# Patient Record
Sex: FEMALE | Race: OTHER | NOT HISPANIC OR LATINO
[De-identification: names, ages, dates, MRNs, and addresses within clinical notes are randomized per-mention and may not be internally consistent; named-entity substitution may affect disease eponyms.]

---

## 2019-07-15 ENCOUNTER — APPOINTMENT (OUTPATIENT)
Dept: OBGYN | Facility: CLINIC | Age: 39
End: 2019-07-15

## 2019-09-19 ENCOUNTER — EMERGENCY (EMERGENCY)
Facility: HOSPITAL | Age: 39
LOS: 0 days | Discharge: HOME | End: 2019-09-19
Attending: EMERGENCY MEDICINE | Admitting: EMERGENCY MEDICINE
Payer: COMMERCIAL

## 2019-09-19 VITALS
SYSTOLIC BLOOD PRESSURE: 119 MMHG | OXYGEN SATURATION: 100 % | HEIGHT: 65 IN | HEART RATE: 70 BPM | WEIGHT: 141.98 LBS | RESPIRATION RATE: 19 BRPM | DIASTOLIC BLOOD PRESSURE: 79 MMHG | TEMPERATURE: 98 F
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
## 2019-09-19 DIAGNOSIS — S09.90XA UNSPECIFIED INJURY OF HEAD, INITIAL ENCOUNTER: ICD-10-CM

## 2019-09-19 DIAGNOSIS — S60.811A ABRASION OF RIGHT WRIST, INITIAL ENCOUNTER: ICD-10-CM
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
## 2019-09-19 DIAGNOSIS — T74.11XA ADULT PHYSICAL ABUSE, CONFIRMED, INITIAL ENCOUNTER: ICD-10-CM
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
## 2019-09-19 DIAGNOSIS — S20.222A CONTUSION OF LEFT BACK WALL OF THORAX, INITIAL ENCOUNTER: ICD-10-CM

## 2019-09-19 PROCEDURE — 99283 EMERGENCY DEPT VISIT LOW MDM: CPT

## 2019-09-19 RX ORDER — IBUPROFEN 200 MG
600 TABLET ORAL ONCE
Refills: 0 | Status: COMPLETED | OUTPATIENT
Start: 2019-09-19 | End: 2019-09-19
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
## 2019-09-19 NOTE — ED PROVIDER NOTE - NSFOLLOWUPINSTRUCTIONS_ED_ALL_ED_FT
Abrasion    WHAT YOU NEED TO KNOW:  An abrasion is a scrape on your skin. It happens when your skin rubs against a rough surface. Some examples of an abrasion include rug burn, a skinned elbow, or road rash. Abrasions can be many shapes and sizes. The wound may hurt, bleed, bruise, or swell.     DISCHARGE INSTRUCTIONS:    Return to the emergency department if:   - The bleeding does not stop after 10 minutes of firm pressure.  - You cannot rinse one or more foreign objects out of your wound.  - You have red streaks on your skin coming from your wound.    Contact your healthcare provider if:     - You have a fever or chills.   - Your abrasion is red, warm, swollen, or draining pus.    You have questions or concerns about your condition or care.    Care for your abrasion:   - Wash your hands and dry them with a clean towel.  - Press a clean cloth against your wound to stop any bleeding.  - Rinse your wound with a lot of clean water. Do not use harsh soap, alcohol, or iodine solutions.  - Use a clean, wet cloth to remove any objects, such as small pieces of rocks or dirt.  - Rub antibiotic ointment on your wound. This may help prevent infection and help your wound heal.  - Cover the wound with a non-stick bandage. Change the bandage daily, and if gets wet or dirty.     Follow up with your healthcare provider as directed: Write down your questions so you remember to ask them during your visits.     Contusion    A contusion is a deep bruise. Contusions are the result of a blunt injury to tissues and muscle fibers under the skin. The skin overlying the contusion may turn blue, purple, or yellow. Symptoms also include pain and swelling in the injured area.    SEEK IMMEDIATE MEDICAL CARE IF YOU HAVE THE FOLLOWING SYMPTOMS: severe pain, numbness, tingling, pain, weakness, or skin color/temperature change in any part of your body distal to the fracture.     Intimate Partner Violence: [place]    Intimate partner violence, also called domestic violence, domestic abuse, or relationship abuse, is a pattern of behaviors used by one partner to gain or maintain power and control over the other partner. Intimate partner violence can happen to women and men and can happen between people who are or were:  .  Dating.  Living together.  [place] law    [place] law defines domestic violence as:    "A pattern of coercive tactics which can include physical, psychological, sexual, economic, and emotional abuse, perpetrated by one person against an adult intimate partner, with the goal of establishing and maintaining power and control over the victim."    What are the types of intimate partner violence?  Different types of abuse can occur at the same time within the same relationship.  Physical abuse. This includes rough handling, threats with a weapon, throwing objects, pushing, or hitting.  Emotional and psychological abuse. This includes verbal attacks, rejection, humiliation, intimidation, social isolation, or threats. Abuse may also include limiting contact with family and friends.  Sexual assault. Sexual assault is any unwanted sexual activity that occurs without clear permission (consent) from both people. This includes unwanted touching and sexual harassment.  Economic abuse. This includes controlling money, food, transportation, or other belongings.  Stalking. This involves such things as repeated, unwanted phone calls, e-mails, or text messages, or watching the victim from a distance.    What are some warning signs of intimate partner violence?  Physical signs     Bruises.  Broken bones.  Burns or cuts.  Physical pain.  Head injury.  Emotional and psychological signs     Crying.  Depression.  Hopelessness.  Desperation.  Trouble sleeping.  Fear of the partner.  Anxiety.  Suicidal thoughts or behavior.  Antisocial behavior.  Low self-esteem.  Fear of intimacy.  Flashbacks.  Sexual signs     Bruising, swelling, or bleeding of the genital or rectal area.  Signs of an STI, such as genital sores, warts, or discharge coming from the genital area.  Pain in the genital area.  Unintended pregnancy.  Problems with pregnancy.  What are common behaviors of those affected by intimate partner violence?  Those affected by intimate partner violence may:  Be late to work or other events.  Not show up to places as promised.  Have to let their partner know where they are and who they are with.  Be isolated or kept from seeing friends or family.  Make comments about their partner's temper or behavior.  Make excuses for their partner.  Engage in high-risk sexual behaviors.  Use drugs or alcohol.  Have unhealthy eating behaviors.  What are common feelings of those affected by intimate partner violence?  Victims of intimate partner violence may feel that they:  Must be careful not to say or do things that trigger their partner's anger.  Cannot do anything right.  Deserve to be treated badly.  Overreact to their partner's behavior or temper.  Cannot trust their own feelings.  Cannot trust other people.  Are trapped.  May have their children taken away by their partner.  Are emotionally drained or numb.  Are in danger.  Might have to kill their partner to survive.  Where can you get help?  If you do not feel safe searching for help online at home, use a computer at a [employer] to access the Internet. Call 911 if you are in immediate danger or need medical help.    Intimate partner violence hotlines and websites     The National Domestic Violence Hotline.  24-hour hotline: 1-911.998.6907 (SAFE) or 1-580.173.7280 (TTY).  Videophone: available Monday through Friday, 9 a.m. to 5 p.m. Call 1-940.467.2972.  [employer]  The [facility] Domestic Violence Hotline. This service is available in multiple languages.  Call: 1-913.691.8828. If you are deaf or hard of hearing, dial 711.  The [facility] Domestic Violence Hotline.  Call: 1-944.592.1584 (HOPE). If you are deaf or hard of hearing, dial 311.  The [place] Sexual Assault Hotline.  24-hour phone hotline: 1-355.260.8592.  [employer]  Shelters for victims of intimate partner violence     If you are a victim of intimate partner violence, there are resources to help you find a temporary place for you and your children to live (shelter). The specific address of these shelters is often not known to the public. A complete list, by [facility], of domestic counseling centers and shelters in [facility] is online: www.[facility].org/find-help/program-directory.html    Police     Report assaults, threats, and stalking to the police.    Counselors and counseling centers      Counseling can help you cope with difficult emotions and empower you to plan for your future safety. The topics you discuss with a counselor are private and confidential. Children of intimate partner violence victims also might need counseling to manage stress and anxiety.    The court system     You can work with a  or an advocate to get legal protection against an abuser. Protection includes restraining orders and private addresses. Crimes against you, such as assault, can also be prosecuted through the courts.    For legal information, contact [employer]:  1-860.758.3218 (HOPE)  [employer].org    Follow these instructions at home:  Create a safety plan that includes ways to remain safe while in an abusive relationship, while you're planning to leave, or after you leave. This plan may be created by the victim alone or with assistance from the domestic violence hotline staff or local shelter staff. Your safety plan may include:  How to cope with emotions.  How to tell friends and family about the abuse.  How to take legal action.  How to create a safe home environment.  How to keep your children safe.  Emergency plans for life-threatening situations.  Get help right away if you:  Feel like you are in immediate danger.  Feel like you may hurt yourself or others.    If you ever feel like you may hurt yourself or others, or may have thoughts about taking your own life, get help right away. You can go to your nearest emergency department or call:     Your local emergency services (911 in the [place]   A suicide crisis helpline, such as the National Suicide Prevention Lifeline at 1-563.843.1453. This is open 24 hours a day.     Summary  If you are in an abusive relationship, there are resources available to you to find ways to leave the relationship.  Create a safety plan to find ways to remain safe while in an abusive relationship, while you're planning to leave, or after you leave.  This information is not intended to replace advice given to you by your health care provider. Make sure you discuss any questions you have with your health care provider.
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
## 2019-09-19 NOTE — ED PROVIDER NOTE - PATIENT PORTAL LINK FT
You can access the FollowMyHealth Patient Portal offered by [facility] by registering at the following website: [email or web address]. By joining [employer]’s FollowMyHealth portal, you will also be able to view your health information using other applications (apps) compatible with our system.

## 2019-09-19 NOTE — ED ADULT NURSE NOTE - NSIMPLEMENTINTERV_GEN_ALL_ED
Implemented All Universal Safety Interventions:  [place] to call system. Call bell, personal items and telephone within reach. Instruct patient to call for assistance. Room bathroom lighting operational. Non-slip footwear when patient is off stretcher. Physically safe environment: no spills, clutter or unnecessary equipment. Stretcher in lowest position, wheels locked, appropriate side rails in place.
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
## 2019-09-19 NOTE — ED PROVIDER NOTE - NS ED ROS FT
Constitutional: no fever, chills, no recent weight loss, change in appetite or malaise  Eyes: no redness/discharge/pain/vision changes  ENT: no rhinorrhea/ear pain/sore throat  Cardiac: No chest pain, SOB or edema.  Respiratory: No cough or respiratory distress  GI: No nausea, vomiting, diarrhea or abdominal pain.  : No dysuria, frequency, urgency or hematuria  MS: + Myalgia. no pain to extremities, no loss of ROM, no weakness  Neuro: No headache or weakness. No LOC.  Skin: + abrasion  Endocrine: No history of thyroid disease or diabetes.

## 2021-08-27 PROBLEM — Z78.9 OTHER SPECIFIED HEALTH STATUS: Chronic | Status: ACTIVE | Noted: 2019-09-19

## 2021-10-29 ENCOUNTER — OUTPATIENT (OUTPATIENT)
Dept: OUTPATIENT SERVICES | Facility: HOSPITAL | Age: 41
LOS: 1 days | Discharge: HOME | End: 2021-10-29

## 2021-10-29 ENCOUNTER — APPOINTMENT (OUTPATIENT)
Dept: OBGYN | Facility: CLINIC | Age: 41
End: 2021-10-29
Payer: MEDICAID

## 2021-10-29 VITALS
HEIGHT: 64 IN | DIASTOLIC BLOOD PRESSURE: 60 MMHG | WEIGHT: 145 LBS | SYSTOLIC BLOOD PRESSURE: 100 MMHG | BODY MASS INDEX: 24.75 KG/M2

## 2021-10-29 DIAGNOSIS — Z00.00 ENCOUNTER FOR GENERAL ADULT MEDICAL EXAMINATION W/OUT ABNORMAL FINDINGS: ICD-10-CM

## 2021-10-29 DIAGNOSIS — Z01.419 ENCOUNTER FOR GYNECOLOGICAL EXAMINATION (GENERAL) (ROUTINE) WITHOUT ABNORMAL FINDINGS: ICD-10-CM

## 2021-10-29 DIAGNOSIS — Z01.419 ENCOUNTER FOR GYNECOLOGICAL EXAMINATION (GENERAL) (ROUTINE) W/OUT ABNORMAL FINDINGS: ICD-10-CM

## 2021-10-29 DIAGNOSIS — Z23 ENCOUNTER FOR IMMUNIZATION: ICD-10-CM

## 2021-10-29 DIAGNOSIS — Z80.3 FAMILY HISTORY OF MALIGNANT NEOPLASM OF BREAST: ICD-10-CM

## 2021-10-29 DIAGNOSIS — Z86.59 PERSONAL HISTORY OF OTHER MENTAL AND BEHAVIORAL DISORDERS: ICD-10-CM

## 2021-10-29 PROCEDURE — 99386 PREV VISIT NEW AGE 40-64: CPT

## 2021-10-29 NOTE — DISCUSSION/SUMMARY
[FreeTextEntry1] : 40yo P3 with PMH of breast mass s/p breast biospy with benign findings for annual exam\par \par Screening\par -mammogram \par -genetics counsellor referral\par -papsmear with HPV testing done today\par -vaginitis panel done per pt request\par -Gardisil 9 vaccine dose #1 given today \par -rtc in 1-2 months for second vaccine

## 2021-10-29 NOTE — PHYSICAL EXAM
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
## 2021-11-08 LAB — HPV HIGH+LOW RISK DNA PNL CVX: NOT DETECTED

## 2021-11-15 LAB — CYTOLOGY CVX/VAG DOC THIN PREP: NORMAL

## 2021-11-26 ENCOUNTER — APPOINTMENT (OUTPATIENT)
Dept: OBGYN | Facility: CLINIC | Age: 41
End: 2021-11-26

## 2021-11-27 ENCOUNTER — RESULT REVIEW (OUTPATIENT)
Age: 41
End: 2021-11-27

## 2021-11-27 ENCOUNTER — OUTPATIENT (OUTPATIENT)
Dept: OUTPATIENT SERVICES | Facility: HOSPITAL | Age: 41
LOS: 1 days | Discharge: HOME | End: 2021-11-27
Payer: MEDICAID

## 2021-11-27 DIAGNOSIS — Z12.31 ENCOUNTER FOR SCREENING MAMMOGRAM FOR MALIGNANT NEOPLASM OF BREAST: ICD-10-CM

## 2021-11-27 PROCEDURE — 77067 SCR MAMMO BI INCL CAD: CPT | Mod: 26

## 2021-11-27 PROCEDURE — 77063 BREAST TOMOSYNTHESIS BI: CPT | Mod: 26

## 2022-09-12 NOTE — ED ADULT NURSE NOTE - NS PRO PASSIVE SMOKE EXP
Anesthesia Evaluation     Patient summary reviewed and Nursing notes reviewed   no history of anesthetic complications:  NPO Solid Status: > 8 hours  NPO Liquid Status: > 8 hours           Airway   Mallampati: II  TM distance: >3 FB  Neck ROM: full  No difficulty expected  Dental      Pulmonary - normal exam   (+) pulmonary embolism,   Cardiovascular - normal exam    (+) hypertension, DVT,       Neuro/Psych  (+) psychiatric history,    GI/Hepatic/Renal/Endo    (+)  GERD,      Musculoskeletal     Abdominal    Substance History      OB/GYN          Other                        Anesthesia Plan    ASA 3     general     intravenous induction     Anesthetic plan, risks, benefits, and alternatives have been provided, discussed and informed consent has been obtained with: patient.    Plan discussed with CRNA.        CODE STATUS:    Level Of Support Discussed With: Patient  Code Status (Patient has no pulse and is not breathing): CPR (Attempt to Resuscitate)  Medical Interventions (Patient has pulse or is breathing): Full Support      
No

## 2022-11-17 ENCOUNTER — EMERGENCY (EMERGENCY)
Facility: HOSPITAL | Age: 42
LOS: 0 days | Discharge: HOME | End: 2022-11-18
Attending: STUDENT IN AN ORGANIZED HEALTH CARE EDUCATION/TRAINING PROGRAM | Admitting: STUDENT IN AN ORGANIZED HEALTH CARE EDUCATION/TRAINING PROGRAM

## 2022-11-17 VITALS — WEIGHT: 160.06 LBS | HEIGHT: 66 IN

## 2022-11-17 DIAGNOSIS — D64.9 ANEMIA, UNSPECIFIED: ICD-10-CM

## 2022-11-17 DIAGNOSIS — R45.1 RESTLESSNESS AND AGITATION: ICD-10-CM

## 2022-11-17 PROCEDURE — 99285 EMERGENCY DEPT VISIT HI MDM: CPT

## 2022-11-17 RX ORDER — HALOPERIDOL DECANOATE 100 MG/ML
5 INJECTION INTRAMUSCULAR ONCE
Refills: 0 | Status: COMPLETED | OUTPATIENT
Start: 2022-11-17 | End: 2022-11-17

## 2022-11-17 NOTE — ED PROVIDER NOTE - PATIENT PORTAL LINK FT
You can access the FollowMyHealth Patient Portal offered by [facility] by registering at the following website: [email or web address]. By joining [employer]’s FollowMyHealth portal, you will also be able to view your health information using other applications (apps) compatible with our system.
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
## 2022-11-17 NOTE — ED PROVIDER NOTE - PHYSICAL EXAMINATION
GENERAL: Patient agitated and uncooperative.  HEAD: No visible or palpable bumps or hematomas. No ecchymosis behind ears B/L.  Eyes: PERRLA, EOMI. No asymmetry. No nystagmus. No conjunctival injection. Non-icteric sclera.  ENMT: MMM.   Neck: Supple. FROM  CVS: RRR  Skin: Warm, Dry. No rashes or lesions. Good cap refill < 2 sec B/L.  EXT: Radial and pedal pulses present B/L. No calf tenderness or swelling B/L. No palpable cords. No pedal edema B/L.  Psych: inappropriate mood and affect, moving body around on stretcher, screaming, agitated, repeating the word "[place]".

## 2022-11-17 NOTE — ED PROVIDER NOTE - NS ED ATTENDING STATEMENT MOD
This was a shared visit with the EMERSON. I reviewed and verified the documentation and independently performed the documented:
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
## 2022-11-17 NOTE — ED PROVIDER NOTE - CLINICAL SUMMARY MEDICAL DECISION MAKING FREE TEXT BOX
Throughout ED observation period, pt remained clinically and hemodynamically stable.  pt mood improved after calming medication. labs w/o acute findings. pt had conversation w/ psych .  psych noted pt had acute stress reaction, however, pt does not show evidence of SI, HI, rome or psychosis and pt does not meet criteria for hospitalization. will dc under [facility] custody

## 2022-11-18 VITALS
SYSTOLIC BLOOD PRESSURE: 138 MMHG | DIASTOLIC BLOOD PRESSURE: 72 MMHG | OXYGEN SATURATION: 99 % | RESPIRATION RATE: 18 BRPM | HEART RATE: 78 BPM | TEMPERATURE: 99 F

## 2022-11-18 DIAGNOSIS — F43.0 ACUTE STRESS REACTION: ICD-10-CM

## 2022-11-18 LAB
ANION GAP SERPL CALC-SCNC: 18 MMOL/L — HIGH (ref 7–14)
APAP SERPL-MCNC: <5 UG/ML — LOW (ref 10–30)
BASOPHILS # BLD AUTO: 0.03 K/UL — SIGNIFICANT CHANGE UP (ref 0–0.2)
BASOPHILS NFR BLD AUTO: 0.4 % — SIGNIFICANT CHANGE UP (ref 0–1)
BUN SERPL-MCNC: 15 MG/DL — SIGNIFICANT CHANGE UP (ref 10–20)
CALCIUM SERPL-MCNC: 9.5 MG/DL — SIGNIFICANT CHANGE UP (ref 8.4–10.5)
CHLORIDE SERPL-SCNC: 101 MMOL/L — SIGNIFICANT CHANGE UP (ref 98–110)
CO2 SERPL-SCNC: 19 MMOL/L — SIGNIFICANT CHANGE UP (ref 17–32)
CREAT SERPL-MCNC: 0.7 MG/DL — SIGNIFICANT CHANGE UP (ref 0.7–1.5)
EGFR: 110 ML/MIN/1.73M2 — SIGNIFICANT CHANGE UP
EOSINOPHIL # BLD AUTO: 0.03 K/UL — SIGNIFICANT CHANGE UP (ref 0–0.7)
EOSINOPHIL NFR BLD AUTO: 0.4 % — SIGNIFICANT CHANGE UP (ref 0–8)
ETHANOL SERPL-MCNC: <10 MG/DL — SIGNIFICANT CHANGE UP
GLUCOSE SERPL-MCNC: 107 MG/DL — HIGH (ref 70–99)
HCT VFR BLD CALC: 28.5 % — LOW (ref 37–47)
HGB BLD-MCNC: 8.2 G/DL — LOW (ref 12–16)
IMM GRANULOCYTES NFR BLD AUTO: 0.4 % — HIGH (ref 0.1–0.3)
LYMPHOCYTES # BLD AUTO: 0.67 K/UL — LOW (ref 1.2–3.4)
LYMPHOCYTES # BLD AUTO: 8.6 % — LOW (ref 20.5–51.1)
MCHC RBC-ENTMCNC: 17.7 PG — LOW (ref 27–31)
MCHC RBC-ENTMCNC: 28.8 G/DL — LOW (ref 32–37)
MCV RBC AUTO: 61.7 FL — LOW (ref 81–99)
MONOCYTES # BLD AUTO: 0.31 K/UL — SIGNIFICANT CHANGE UP (ref 0.1–0.6)
MONOCYTES NFR BLD AUTO: 4 % — SIGNIFICANT CHANGE UP (ref 1.7–9.3)
NEUTROPHILS # BLD AUTO: 6.76 K/UL — HIGH (ref 1.4–6.5)
NEUTROPHILS NFR BLD AUTO: 86.2 % — HIGH (ref 42.2–75.2)
NRBC # BLD: 0 /100 WBCS — SIGNIFICANT CHANGE UP (ref 0–0)
PLATELET # BLD AUTO: 182 K/UL — SIGNIFICANT CHANGE UP (ref 130–400)
POTASSIUM SERPL-MCNC: 3.6 MMOL/L — SIGNIFICANT CHANGE UP (ref 3.5–5)
POTASSIUM SERPL-SCNC: 3.6 MMOL/L — SIGNIFICANT CHANGE UP (ref 3.5–5)
RBC # BLD: 4.62 M/UL — SIGNIFICANT CHANGE UP (ref 4.2–5.4)
RBC # FLD: 18.2 % — HIGH (ref 11.5–14.5)
SALICYLATES SERPL-MCNC: <0.3 MG/DL — LOW (ref 4–30)
SODIUM SERPL-SCNC: 138 MMOL/L — SIGNIFICANT CHANGE UP (ref 135–146)
WBC # BLD: 7.83 K/UL — SIGNIFICANT CHANGE UP (ref 4.8–10.8)
WBC # FLD AUTO: 7.83 K/UL — SIGNIFICANT CHANGE UP (ref 4.8–10.8)

## 2022-11-18 PROCEDURE — 93010 ELECTROCARDIOGRAM REPORT: CPT

## 2022-11-18 PROCEDURE — 90792 PSYCH DIAG EVAL W/MED SRVCS: CPT | Mod: 95

## 2022-11-18 RX ADMIN — Medication 2 MILLIGRAM(S): at 00:35

## 2022-11-18 RX ADMIN — HALOPERIDOL DECANOATE 5 MILLIGRAM(S): 100 INJECTION INTRAMUSCULAR at 00:35

## 2022-11-18 NOTE — ED BEHAVIORAL HEALTH NOTE - BEHAVIORAL HEALTH NOTE
==================        PRE-HOSPITAL COURSE        ===================              SOURCE:  Secondhand EMR documentation.               DETAILS:  Patient BIBPD; chief complaint of agitated behavior.           ===========        ED COURSE:        ============              SOURCE:  RN and secondhand EMR documentation.               ARRIVAL:  Patient was uncooperative with hospital protocol and agitated upon arrival, repeating words, not answering questions. Patient presents with good hygiene/grooming. Patient placed on 1:1 observation.              BELONGINGS:  None notable.              BEHAVIOR: Blood provided for routine labs. No SI/HI/AH/VH elicited; patient not participating in assessment questions, refusing to engage with provider, observed to be thrashing in bed and yelling. Patient presently laying in hospital stretcher.           TREATMENT: Patient received 5mg Haldol and 2mg Ativan IM for acute agitation.            VISITORS:  Patient presently unaccompanied by social supports while in ED.

## 2022-11-18 NOTE — ED BEHAVIORAL HEALTH ASSESSMENT NOTE - DIFFERENTIAL
Acute stress reaction causing mixed disturbance of emotions & conduct, improved  Generalized Anxiety Disorder (by hx)  r/o histrionic personality
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
## 2022-11-18 NOTE — ED ADULT NURSE NOTE - PRIMARY CARE PROVIDER
Quality 431: Preventive Care And Screening: Unhealthy Alcohol Use - Screening: Patient not identified as an unhealthy alcohol user when screened for unhealthy alcohol use using a systematic screening method Quality 110: Preventive Care And Screening: Influenza Immunization: Influenza Immunization not Administered for Documented Reasons. Detail Level: Detailed Quality 226: Preventive Care And Screening: Tobacco Use: Screening And Cessation Intervention: Patient screened for tobacco use and is an ex/non-smoker Quality 111:Pneumonia Vaccination Status For Older Adults: Pneumococcal vaccine was not administered on or after patient’s 60th birthday and before the end of the measurement period, reason not otherwise specified PMD
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
## 2022-11-18 NOTE — ED BEHAVIORAL HEALTH ASSESSMENT NOTE - OTHER
[place] [facility] ([address] [facility]) PD Records checked– with data: [facility], [facility] [facility], [facility], google search. Records checked- no data:  [facility], [facility], [facility], [facility]its, [facility], [facility], [facility], [facility], [facility], [facility], [facility], [facility], [facility] Medical, [facility], [facility], [facility], [facility].

## 2022-11-18 NOTE — ED BEHAVIORAL HEALTH ASSESSMENT NOTE - NSSUICPROTFACT_PSY_ALL_CORE
Responsibility to children, family, or others/Supportive social network of family or friends/Cultural, spiritual and/or moral attitudes against suicide/Engaged in work or school
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
## 2024-01-13 ENCOUNTER — NON-APPOINTMENT (OUTPATIENT)
Age: 44
End: 2024-01-13

## 2024-02-13 ENCOUNTER — NON-APPOINTMENT (OUTPATIENT)
Age: 44
End: 2024-02-13